# Patient Record
Sex: MALE | Race: OTHER | HISPANIC OR LATINO | Employment: FULL TIME | URBAN - METROPOLITAN AREA
[De-identification: names, ages, dates, MRNs, and addresses within clinical notes are randomized per-mention and may not be internally consistent; named-entity substitution may affect disease eponyms.]

---

## 2020-02-28 ENCOUNTER — HOSPITAL ENCOUNTER (EMERGENCY)
Facility: HOSPITAL | Age: 40
Discharge: HOME | End: 2020-02-28
Attending: EMERGENCY MEDICINE | Admitting: EMERGENCY MEDICINE
Payer: OTHER MISCELLANEOUS

## 2020-02-28 VITALS
SYSTOLIC BLOOD PRESSURE: 157 MMHG | DIASTOLIC BLOOD PRESSURE: 95 MMHG | RESPIRATION RATE: 18 BRPM | HEART RATE: 84 BPM | TEMPERATURE: 97.8 F | OXYGEN SATURATION: 98 %

## 2020-02-28 DIAGNOSIS — M54.12 CERVICAL RADICULOPATHY: Primary | ICD-10-CM

## 2020-02-28 PROCEDURE — 99283 EMERGENCY DEPT VISIT LOW MDM: CPT

## 2020-02-28 PROCEDURE — 99284 EMERGENCY DEPT VISIT MOD MDM: CPT | Performed by: PHYSICIAN ASSISTANT

## 2020-02-28 RX ORDER — NAPROXEN 500 MG/1
500 TABLET ORAL 2 TIMES DAILY WITH MEALS
COMMUNITY
End: 2020-03-03 | Stop reason: SDUPTHER

## 2020-02-28 NOTE — DISCHARGE INSTRUCTIONS
Please refer to the attached information for strict return instructions  If symptoms worsen or new symptoms develop please return to the ER  Please follow up with orthopedics OR with primary care physician

## 2020-02-29 NOTE — ED PROVIDER NOTES
History  Chief Complaint   Patient presents with    Arm Pain     Pt states that he went to [facility] and that he had XRAYS but they didn't find anything and told him to find a specialist  Pt c/o right sided neck pain that radiates down right arm after lifting something heavy at work      Halina Kline is a 45 yo M presenting with R sided neck/shoulder pain which radiates down R arm ongoing for approximately 2-3 weeks after lifting a heavy object at work  States pain is sharp, shooting, radiates down R arm to level of hand  Describes tingling in R hand, denies numbness/weakness  No other recent fall/trauma  Denies chest pain/SOB  Denies headache  No fevers/chills  Taking naproxen/robaxin as needed with minimal relief  Pt requests referral to specialist for follow up  History provided by:  Patient   used: No    Neck Pain   Pain location:  R side  Quality:  Stabbing and shooting  Pain radiates to:  R arm and R shoulder  Duration:  3 weeks  Timing:  Constant  Progression:  Waxing and waning  Chronicity:  New  Context: lifting a heavy object    Context: not fall and not recent injury    Associated symptoms: tingling    Associated symptoms: no bladder incontinence, no bowel incontinence, no chest pain, no fever, no numbness and no weakness    Risk factors: no hx of spinal trauma, no recent epidural and no recent head injury        Prior to Admission Medications   Prescriptions Last Dose Informant Patient Reported? Taking?   naproxen (NAPROSYN) 500 mg tablet   Yes Yes   Sig: Take 500 mg by mouth 2 (two) times a day with meals      Facility-Administered Medications: None       History reviewed  No pertinent past medical history  History reviewed  No pertinent surgical history  History reviewed  No pertinent family history  I have reviewed and agree with the history as documented      E-Cigarette/Vaping    E-Cigarette Use Never User      E-Cigarette/Vaping Substances     Social History     Tobacco Use    Smoking status: Never Smoker    Smokeless tobacco: Never Used   Substance Use Topics    Alcohol use: Yes     Comment: socially    Drug use: Not Currently       Review of Systems   Constitutional: Negative for chills and fever  HENT: Negative for congestion, rhinorrhea and sore throat  Eyes: Negative for pain and visual disturbance  Respiratory: Negative for cough, shortness of breath and wheezing  Cardiovascular: Negative for chest pain and palpitations  Gastrointestinal: Negative for abdominal pain, bowel incontinence, nausea and vomiting  Genitourinary: Negative for bladder incontinence, dysuria, frequency and urgency  Musculoskeletal: Positive for arthralgias and neck pain  Negative for back pain, joint swelling and neck stiffness  Skin: Negative for rash and wound  Neurological: Positive for tingling  Negative for dizziness, weakness, light-headedness and numbness  Physical Exam  Physical Exam   Constitutional: He is oriented to person, place, and time  He appears well-developed and well-nourished  No distress  HENT:   Head: Normocephalic and atraumatic  Right Ear: External ear normal    Left Ear: External ear normal    Mouth/Throat: Oropharynx is clear and moist    Eyes: Pupils are equal, round, and reactive to light  Conjunctivae and EOM are normal    Neck: Normal range of motion  Neck supple  Cardiovascular: Normal rate, regular rhythm, normal heart sounds and intact distal pulses  Exam reveals no gallop and no friction rub  No murmur heard  Pulmonary/Chest: Effort normal and breath sounds normal  No respiratory distress  He has no wheezes  Abdominal: Soft  He exhibits no distension  There is no tenderness  Musculoskeletal: He exhibits tenderness  R sided cervical paraspinal TTP, TTP over R trapezius  Normal ROM c-spine in flexion/extension  Normal ROM R shoulder in abduction/flexion  5/5 symmetric strength to b/l UE   Sensation normal, symmetric b/l UE Lymphadenopathy:     He has no cervical adenopathy  Neurological: He is alert and oriented to person, place, and time  He exhibits normal muscle tone  Coordination normal    Skin: Skin is warm and dry  Capillary refill takes less than 2 seconds  No rash noted  He is not diaphoretic  No erythema  Psychiatric: He has a normal mood and affect  His behavior is normal  Judgment and thought content normal        Vital Signs  ED Triage Vitals   Temperature Pulse Respirations Blood Pressure SpO2   02/28/20 1445 02/28/20 1445 02/28/20 1445 02/28/20 1445 02/28/20 1445   97 8 °F (36 6 °C) 84 18 157/95 98 %      Temp Source Heart Rate Source Patient Position - Orthostatic VS BP Location FiO2 (%)   02/28/20 1445 02/28/20 1445 02/28/20 1445 02/28/20 1445 --   Oral Monitor Sitting Right arm       Pain Score       02/28/20 1449       6           Vitals:    02/28/20 1445   BP: 157/95   Pulse: 84   Patient Position - Orthostatic VS: Sitting         Visual Acuity      ED Medications  Medications - No data to display    Diagnostic Studies  Results Reviewed     None                 No orders to display              Procedures  Procedures         ED Course                               MDM  Number of Diagnoses or Management Options  Cervical radiculopathy:   Diagnosis management comments: History/exam c/w R sided cervical radiculopathy  Two previous evaluations for similar  Pt requests referral to specialist  Will refer pt for follow up with orthopedics/PCP  Supportive care at home  Return indications discussed       Patient Progress  Patient progress: stable        Disposition  Final diagnoses:   Cervical radiculopathy     Time reflects when diagnosis was documented in both MDM as applicable and the Disposition within this note     Time User Action Codes Description Comment    2/28/2020  3:48 PM [facility] Add [Y14 12] Cervical radiculopathy       ED Disposition     ED Disposition Condition Date/Time Comment    Discharge Stable Fri Feb 28, 2020  3:47 PM Jimmy Cruz discharge to home/self care  Follow-up Information     Follow up With Specialties Details Why Contact Info Additional [address] [facility] Orthopedic Surgery Schedule an appointment as soon as possible for a visit   [address]  [address] [facility] [address]  [address] [facility], [address], [address] [place], [place], [address]   [address] [facility] Schedule an appointment as soon as possible for a visit   [address], [address] [facility] [address]  [address], [address], [address] [place], [place], [address]          Discharge Medication List as of 2/28/2020  3:51 PM      CONTINUE these medications which have NOT CHANGED    Details   naproxen (NAPROSYN) 500 mg tablet Take 500 mg by mouth 2 (two) times a day with meals, Historical Med           No discharge procedures on file      PDMP Review     None          ED Provider  Electronically Signed by           Caro Arroyo PA-C  02/29/20 [id number]

## 2020-03-02 RX ORDER — METHOCARBAMOL 750 MG/1
750 TABLET, FILM COATED ORAL 4 TIMES DAILY PRN
COMMUNITY
Start: 2020-02-21 | End: 2020-06-01

## 2020-03-02 RX ORDER — NAPROXEN 500 MG/1
500 TABLET ORAL EVERY 12 HOURS PRN
COMMUNITY
Start: 2020-02-21 | End: 2020-03-03 | Stop reason: HOSPADM

## 2020-03-02 NOTE — PROGRESS NOTES
1  Neck pain  naproxen (NAPROSYN) 500 mg tablet    [facility]   2  Acute pain of right shoulder  [facility]   3  Radiculopathy, cervical region  Ambulatory referral to Pain Management   4  Impingement syndrome of right shoulder  Large joint arthrocentesis     Orders Placed This Encounter   Procedures    Large joint arthrocentesis    Ambulatory referral to Pain Management    [facility]        Imaging Studies (I personally reviewed images in PACS and report):      IMPRESSION:  Cervical radiculopathy  Right shoulder impingement syndrome      Repeat X-ray next visit:  Right shoulder       Return in about 1 week (around 3/10/2020)  Patient Instructions   I explained my clinical findings to patient today  I believe most of his pain is related to cervical radiculopathy with some component of right shoulder impingement syndrome  I will refer him to physical therapy and pain management for cervical neuropathy  I also provided him with intra-articular corticosteroid diagnostic/therapeutic injection to right shoulder  I provided him with work note that states that he should not use he has right upper extremity for next 7 days      Up to 20% of the population suffers from neck pain, and of the causes for neck pain, arthritis is the most common  The neck bones are known as vertebrae and number from C1 (cervical 1) to C7  The most common sites of "degenerative changes" (arthritis) are "betwween C4 & C7 " Nerves come out between each of these vertebrae and are labeled the left and right nerves  For example, the left C8 nerve is responsible for left sided finger flexion and sesnation of the pinky small finger  Some nerves higher up such as C3-C5 control other basic functions such as breathing          Causes of neck pain include neck strain which is an injury to muscles of the neck that results in neck muscles and trapezius tenderness, does not have any pinched nerve arm pain or numbness (known as radiculopathy), and lasts up to 6 weeks  Another name for neck strain is whiplash injury which often occurs in car accidents and can range from mild stiffness with some loss of movement to pinched nerves and even fractures of the vertebrae in severe cases  Arthritis of the neck is called Spondylosis and comes in different types  Facet joint arthritis describes loss of cushion between the small joints between the vertebrae and can cause chronic pain but not all people with spondylosis have persistent pain  The large joints between the vertebrae known as discs can also become arthritic as defined by loss of cushion space between these vertebrae known as DDD (degenerative disc disease)  May people have DDD with some sutdies showing up to 81% of people on average of 38yo however most do not have any pain until years later  Pinched nerves of the spine can occur in two ways  Radiculopathy is pinching of the nerves that branch off of the spinal cord at each level and usually cause neck pain and symptoms of pain or numbness associated with the upper extremity fed by that nerve  Spinal stenosis, in contrast, is a narrowing of the spinal canal and causing pinching of the spinal cord which can cause symptoms throughout the body including lower leg weakness, walking problems, and bladder and bowel dysfunction  Most neck strains do not require xrays however we generally gather more information with xrays in people who have history of traumatic event or have pain that does not resolve after 6 weeks of treatment  We reserve MRIs for red flags symptoms (fevers, risks of cancer such as unintentional weight loss, night sweats, prior history of cancer, muscle weakness) and for neck pain that does not resolve after 6 weeks of conservative management   (uptodate Troy soriano 10/2018)    Treatment of neck pain includes special attention to ergonomics or sitting posture to avoid hunching and bad sleeping habits, physical therapy to strengthen muscles of the neck, and medications  Pain relievers such as tylenol are mainstays of treatment as well as anti-inflammatories such as naproxen (aleve), and ibuprofen (advil)  Muscle relaxer may be used as well and are reserved for use at night-time; however, they may be used during the day and if so, then the lowest possible does is used to avoid drowsiness and patients are instructed to refrain from driving or operating machinery due to risk of injury  Spinal manipulation as performed by a chiropractor or an osteopathic physician (DO) who performs manipulation as a part of practice may also be used if not improving with first-line treatments such as home exercise, medications, and physical therapy  For pain not responding to 6 weeks of conservative measures as outlined above, we generally order and MRI for further evaluation and refer to pain management for consideration of advance procedures such as neck injections (uptodate Nika Beard 10/2018)  You have been diagnosed with Rotator cuff impingement which is a pinching of the tendon, or cable, that attaches to the arm bone known as the humerus on the outside of your shoulder and can lead to pain, feelings of weakness due to pain, and worsening pain with reaching overhead, repetitive movements with your arm, or lying on your shoulder at night  There is no indication today of a significant or large rotator cuff tear requiring surgery although it is possible you may still have a small tear that does not require surgery at the moment  Impingement and even small rotator cuff tear symptoms usually improve with rest, range of motion exercises, and physical therapy over 1-3 months but sometimes can take 4-6 months  Additional treatments include steroid injection for inflammation; however, the pain relief from steroid injections is not long lasting and the curative treatment is physical therapy   If you continue to fail to improve with conservative measures after 3 months or if there is a red flag, then we will discuss obtaining an MRI at future visits to evaluate for severe rotator cuff tear  Surgery for impingement syndrome is usually not considered until after 4-6 months of initial therapy unless a severe rotator cuff tear is found on MRI  CHIEF COMPLAINT:  Neck pain, right shoulder pain    HPI:  Abimbola Miles is a 44 y o  male  who presents for       Visit 03/03/2020  44year-old male here with 2 week history of right neck and shoulder pain after he lifted heavy object(3 cases of ensure supplements)  He describes pain as sharp, 10/10, located to right side of the neck, radiating down the arm, associated with tingling and numbness  He denies any weakness  No previous injury to neck or shoulder  She went to ER 3 times in last 3 weeks  He has been taking Aleve with some improvement of his symptoms  Summary of ER visit to to 02/20/2020; patient presented with 2-3 week history of right-sided neck and shoulder pain which radiates down right arm  He was diagnosed with cervical radiculopathy and refer to [facility] Meneses [address]  Review of Systems   Constitutional: Negative for chills and fever  HENT: Negative for hearing loss and sore throat  Respiratory: Negative for cough and shortness of breath  Cardiovascular: Negative for chest pain and palpitations  Gastrointestinal: Negative for abdominal pain and nausea  Neurological: Negative for dizziness and numbness  Following history reviewed and update:    History reviewed  No pertinent past medical history  History reviewed  No pertinent surgical history    Social History   Social History     Substance and Sexual Activity   Alcohol Use Yes    Comment: socially     Social History     Substance and Sexual Activity   Drug Use Not Currently     Social History     Tobacco Use   Smoking Status Never Smoker   Smokeless Tobacco Never Used     No family history on file  Allergies   Allergen Reactions    Acetaminophen Rash          Physical Exam  /85 (BP Location: Left arm, Patient Position: Sitting, Cuff Size: Adult)   Pulse 91   Ht 5' 11" (1 803 m)   Wt 97 1 kg (214 lb)   BMI 29 85 kg/m²     Constitutional:  see vital signs  Gen: well-developed, normocephalic/atraumatic, well-groomed  Eyes: No inflammation or discharge of conjunctiva or lids; sclera clear   Pharynx: no inflammation, lesion, or mass of lips  Neck: supple, no masses, non-distended  MSK: no inflammation, lesion, mass, or clubbing of nails and digits except for other than mentioned below  SKIN: no visible rashes or skin lesions  Pulmonary/Chest: Effort normal  No respiratory distress     NEURO: cranial nerves grossly intact  PSYCH:  Alert and oriented to person, place, and time; recent and remote memory intact; mood normal, no depression, anxiety, or agitation, judgment and insight good and intact     Ortho Exam  Cervical  ROM: intact  Tenderness: no spinous process tenderness;   Sensation UE Bilateral:  Decreased sensation in right upper extremity C5, C6, C7    C8: normal  T1: normal  Strength UE: 5/5 elbow, wrist, fingers bilatearl  Reflexes: symmetric bilateral triceps, biceps, corachobrachiais  Spurlings:  Negative     RIGHT SHOULDER:  Erythema: no  Swelling: no  Increased Warmth: no    Tenderness:  + mild subacromial    ROM  Touchdown sign: intact  Passive: symmetric  Internal rotation to hip level    Strength  Abduction: 5/5  ER: 5/5  IR: 5/5    Drop-Arm: negative  Emptycan: negative  Belly Press: negative  Lift-off Test:  Negative    Ott:  Positive  Neer: negative  Cross-Arm: negative  Speeds: negative    LEFT SHOULDER:  Strength  Abduction: 5/5  ER: 5/5  IR: 5/5    ROM  Touchdown sign: intact  Appley Scratch Test: symmetric  Passive: symmetric    Empty can: negative      Large joint arthrocentesis  Date/Time: 3/3/2020 11:21 AM  Consent given by: patient  Supporting Documentation  Indications: pain   Procedure Details  Location: shoulder -   Needle size: 22 G  Ultrasound guidance: no  Approach: posterolateral  Medications administered: 4 mL lidocaine 1 %; 40 mg triamcinolone acetonide 40 mg/mL    Patient tolerance: patient tolerated the procedure well with no immediate complications  Dressing:  Sterile dressing applied            ATTESTATION:  I have personally interviewed and examined patient  I have reviewed curtis findings and plan with resident/fellow as outlined in note  I agree with exam and plan as documented in note

## 2020-03-03 ENCOUNTER — OFFICE VISIT (OUTPATIENT)
Dept: OBGYN CLINIC | Facility: OTHER | Age: 40
End: 2020-03-03
Payer: OTHER MISCELLANEOUS

## 2020-03-03 VITALS
BODY MASS INDEX: 29.96 KG/M2 | HEIGHT: 71 IN | HEART RATE: 91 BPM | SYSTOLIC BLOOD PRESSURE: 139 MMHG | WEIGHT: 214 LBS | DIASTOLIC BLOOD PRESSURE: 85 MMHG

## 2020-03-03 DIAGNOSIS — M54.12 RADICULOPATHY, CERVICAL REGION: ICD-10-CM

## 2020-03-03 DIAGNOSIS — M54.2 NECK PAIN: Primary | ICD-10-CM

## 2020-03-03 DIAGNOSIS — M25.511 ACUTE PAIN OF RIGHT SHOULDER: ICD-10-CM

## 2020-03-03 DIAGNOSIS — M75.41 IMPINGEMENT SYNDROME OF RIGHT SHOULDER: ICD-10-CM

## 2020-03-03 PROCEDURE — 99204 OFFICE O/P NEW MOD 45 MIN: CPT | Performed by: FAMILY MEDICINE

## 2020-03-03 PROCEDURE — 20610 DRAIN/INJ JOINT/BURSA W/O US: CPT | Performed by: FAMILY MEDICINE

## 2020-03-03 RX ORDER — NAPROXEN 500 MG/1
500 TABLET ORAL 2 TIMES DAILY WITH MEALS
Qty: 28 TABLET | Refills: 0 | Status: SHIPPED | OUTPATIENT
Start: 2020-03-03 | End: 2020-03-09

## 2020-03-03 RX ADMIN — TRIAMCINOLONE ACETONIDE 40 MG: 40 INJECTION, SUSPENSION INTRA-ARTICULAR; INTRAMUSCULAR at 11:21

## 2020-03-03 RX ADMIN — LIDOCAINE HYDROCHLORIDE 4 ML: 10 INJECTION, SOLUTION INFILTRATION; PERINEURAL at 11:21

## 2020-03-03 NOTE — PATIENT INSTRUCTIONS
I explained my clinical findings to patient today  I believe most of his pain is related to cervical radiculopathy with some component of right shoulder impingement syndrome  I will refer him to physical therapy and pain management for cervical neuropathy  I also provided him with intra-articular corticosteroid diagnostic/therapeutic injection to right shoulder  I provided him with work note that states that he should not use he has right upper extremity for next 7 days      Up to 20% of the population suffers from neck pain, and of the causes for neck pain, arthritis is the most common  The neck bones are known as vertebrae and number from C1 (cervical 1) to C7  The most common sites of "degenerative changes" (arthritis) are "betwween C4 & C7 " Nerves come out between each of these vertebrae and are labeled the left and right nerves  For example, the left C8 nerve is responsible for left sided finger flexion and sesnation of the pinky small finger  Some nerves higher up such as C3-C5 control other basic functions such as breathing  Causes of neck pain include neck strain which is an injury to muscles of the neck that results in neck muscles and trapezius tenderness, does not have any pinched nerve arm pain or numbness (known as radiculopathy), and lasts up to 6 weeks  Another name for neck strain is whiplash injury which often occurs in car accidents and can range from mild stiffness with some loss of movement to pinched nerves and even fractures of the vertebrae in severe cases  Arthritis of the neck is called Spondylosis and comes in different types  Facet joint arthritis describes loss of cushion between the small joints between the vertebrae and can cause chronic pain but not all people with spondylosis have persistent pain       The large joints between the vertebrae known as discs can also become arthritic as defined by loss of cushion space between these vertebrae known as DDD (degenerative disc disease)  May people have DDD with some cleoes showing up to 81% of people on average of 38yo however most do not have any pain until years later  Pinched nerves of the spine can occur in two ways  Radiculopathy is pinching of the nerves that branch off of the spinal cord at each level and usually cause neck pain and symptoms of pain or numbness associated with the upper extremity fed by that nerve  Spinal stenosis, in contrast, is a narrowing of the spinal canal and causing pinching of the spinal cord which can cause symptoms throughout the body including lower leg weakness, walking problems, and bladder and bowel dysfunction  Most neck strains do not require xrays however we generally gather more information with xrays in people who have history of traumatic event or have pain that does not resolve after 6 weeks of treatment  We reserve MRIs for red flags symptoms (fevers, risks of cancer such as unintentional weight loss, night sweats, prior history of cancer, muscle weakness) and for neck pain that does not resolve after 6 weeks of conservative management  (uptodaguy [employer] al 10/2018)    Treatment of neck pain includes special attention to ergonomics or sitting posture to avoid hunching and bad sleeping habits, physical therapy to strengthen muscles of the neck, and medications  Pain relievers such as tylenol are mainstays of treatment as well as anti-inflammatories such as naproxen (aleve), and ibuprofen (advil)  Muscle relaxer may be used as well and are reserved for use at night-time; however, they may be used during the day and if so, then the lowest possible does is used to avoid drowsiness and patients are instructed to refrain from driving or operating machinery due to risk of injury       Spinal manipulation as performed by a chiropractor or an osteopathic physician (DO) who performs manipulation as a part of practice may also be used if not improving with first-line treatments such as home exercise, medications, and physical therapy  For pain not responding to 6 weeks of conservative measures as outlined above, we generally order and MRI for further evaluation and refer to pain management for consideration of advance procedures such as neck injections (uptodate Lauren Ashford 10/2018)  You have been diagnosed with Rotator cuff impingement which is a pinching of the tendon, or cable, that attaches to the arm bone known as the humerus on the outside of your shoulder and can lead to pain, feelings of weakness due to pain, and worsening pain with reaching overhead, repetitive movements with your arm, or lying on your shoulder at night  There is no indication today of a significant or large rotator cuff tear requiring surgery although it is possible you may still have a small tear that does not require surgery at the moment  Impingement and even small rotator cuff tear symptoms usually improve with rest, range of motion exercises, and physical therapy over 1-3 months but sometimes can take 4-6 months  Additional treatments include steroid injection for inflammation; however, the pain relief from steroid injections is not long lasting and the curative treatment is physical therapy  If you continue to fail to improve with conservative measures after 3 months or if there is a red flag, then we will discuss obtaining an MRI at future visits to evaluate for severe rotator cuff tear  Surgery for impingement syndrome is usually not considered until after 4-6 months of initial therapy unless a severe rotator cuff tear is found on MRI

## 2020-03-03 NOTE — LETTER
March 3, 2020     Patient: Charlotte Gary   YOB: 1980   Date of Visit: 3/3/2020       To Whom it May Concern:    Shoaib Santiago is under my professional care  He was seen in my office on 3/3/2020  He should not go to work for next 7 days  He will be reevaluated by me in 7 days    If you have any questions or concerns, please don't hesitate to call  Sincerely,          Ruthann Stern III, DO        CC: Jimmy Cruz

## 2020-03-06 RX ORDER — LIDOCAINE HYDROCHLORIDE 10 MG/ML
4 INJECTION, SOLUTION INFILTRATION; PERINEURAL
Status: COMPLETED | OUTPATIENT
Start: 2020-03-03 | End: 2020-03-03

## 2020-03-06 RX ORDER — TRIAMCINOLONE ACETONIDE 40 MG/ML
40 INJECTION, SUSPENSION INTRA-ARTICULAR; INTRAMUSCULAR
Status: COMPLETED | OUTPATIENT
Start: 2020-03-03 | End: 2020-03-03

## 2020-03-09 ENCOUNTER — APPOINTMENT (OUTPATIENT)
Dept: RADIOLOGY | Facility: MEDICAL CENTER | Age: 40
End: 2020-03-09
Payer: OTHER MISCELLANEOUS

## 2020-03-09 ENCOUNTER — HOSPITAL ENCOUNTER (OUTPATIENT)
Dept: MRI IMAGING | Facility: HOSPITAL | Age: 40
Discharge: HOME | End: 2020-03-09
Payer: OTHER MISCELLANEOUS

## 2020-03-09 ENCOUNTER — TELEPHONE (OUTPATIENT)
Dept: OBGYN CLINIC | Facility: MEDICAL CENTER | Age: 40
End: 2020-03-09

## 2020-03-09 ENCOUNTER — OFFICE VISIT (OUTPATIENT)
Dept: OBGYN CLINIC | Facility: MEDICAL CENTER | Age: 40
End: 2020-03-09
Payer: OTHER MISCELLANEOUS

## 2020-03-09 VITALS
SYSTOLIC BLOOD PRESSURE: 161 MMHG | WEIGHT: 215 LBS | BODY MASS INDEX: 30.1 KG/M2 | DIASTOLIC BLOOD PRESSURE: 85 MMHG | HEART RATE: 94 BPM | HEIGHT: 71 IN

## 2020-03-09 DIAGNOSIS — R29.898 WEAKNESS OF RIGHT ARM: ICD-10-CM

## 2020-03-09 DIAGNOSIS — M25.511 ACUTE PAIN OF RIGHT SHOULDER: ICD-10-CM

## 2020-03-09 DIAGNOSIS — M79.601 RIGHT ARM PAIN: Primary | ICD-10-CM

## 2020-03-09 DIAGNOSIS — M79.601 RIGHT ARM PAIN: ICD-10-CM

## 2020-03-09 PROCEDURE — 72141 MRI NECK SPINE W/O DYE: CPT

## 2020-03-09 PROCEDURE — 73030 X-RAY EXAM OF SHOULDER: CPT

## 2020-03-09 PROCEDURE — 99214 OFFICE O/P EST MOD 30 MIN: CPT | Performed by: FAMILY MEDICINE

## 2020-03-09 NOTE — LETTER
March 9, 2020     Patient: Eber Kasper   YOB: 1980   Date of Visit: 3/9/2020       To Whom it May Concern:    Yves Arvizusteffen is under my professional care  He was seen in my office on 3/9/2020  He may return to work on 03/09/2020 with light duty restrictions  Recommend against use of right upper extremity  Recommend against forklift driving     He will be re-evaluated in next 2 3 weeks  If you have any questions or concerns, please don't hesitate to call  Sincerely,          Jolene Allan III, DO        CC: Jimmy Cruz

## 2020-03-09 NOTE — TELEPHONE ENCOUNTER
Needs appt with pain management urgently due to weakness right arm  I have already ordered mri cervical spine

## 2020-03-09 NOTE — PATIENT INSTRUCTIONS
Explained the patient that he has evidence of pinched nerve on his examination today with developing weakness in his right arm  As such I ordered stat MRI of cervical vertebra as well as stat Pain Management appointment for further evaluation  He did have a subacromial injection in the past which on resulted in minimal to mild relief for 2 days but his exam today is more consistent with cervical radiculopathy  I explained risk of persistent weakness if does not follow-up for urgent evaluation  Patient expressed understanding and agreed to plan  Stop naproxen  Recommend against tylenol due to history of allergy  Start trial diclofenac gel    I notified of elevated blood pressure which can be a sign of new hypertension or undiagnosed hypertension  I reviewed risks of elevated blood pressure including heart attack and stroke  I recommended go to ER if develops any headaches, change in vision, chest pain, SOB/trouble breathing  I instructed to follow-up with PCP and/or cardiologist for further evaluation  Patient expressed understanding and agreed to plan

## 2020-03-09 NOTE — PROGRESS NOTES
1  Right arm pain  Ambulatory referral to Pain Management    MRI cervical spine wo contrast    diclofenac sodium (VOLTAREN) 1 %   2  Acute pain of right shoulder  XR shoulder 2+ vw right    Ambulatory referral to Pain Management    MRI cervical spine wo contrast    diclofenac sodium (VOLTAREN) 1 %   3  Weakness of right arm  Ambulatory referral to Pain Management    MRI cervical spine wo contrast     Orders Placed This Encounter   Procedures    XR shoulder 2+ vw right    MRI cervical spine wo contrast    Ambulatory referral to Pain Management        Imaging Studies (I personally reviewed images in PACS and report):  X-ray right shoulder 03/09/2020:  No acute osseous abnormality  X-ray cervical vertebra 03/09/2020:  No acute osseous abnormality  IMPRESSION:  Right Arm pain  Right Arm weakness  Differential diagnosis:  Cervical radiculopathy  Component of subacromial impingement with 2 day relief status post subacromial injection corticosteroid      Repeat X-ray next visit:   none      Return for follow-up with pain management   Patient Instructions   Explained the patient that he has evidence of pinched nerve on his examination today with developing weakness in his right arm  As such I ordered stat MRI of cervical vertebra as well as stat Pain Management appointment for further evaluation  He did have a subacromial injection in the past which on resulted in minimal to mild relief for 2 days but his exam today is more consistent with cervical radiculopathy  I explained risk of persistent weakness if does not follow-up for urgent evaluation  Patient expressed understanding and agreed to plan  Stop naproxen  Recommend against tylenol due to history of allergy  Start trial diclofenac gel    I notified of elevated blood pressure which can be a sign of new hypertension or undiagnosed hypertension  I reviewed risks of elevated blood pressure including heart attack and stroke   I recommended go to ER if develops any headaches, change in vision, chest pain, SOB/trouble breathing  I instructed to follow-up with PCP and/or cardiologist for further evaluation  Patient expressed understanding and agreed to plan  CHIEF COMPLAINT:  Follow-up right shoulder pain and arm pain    HPI:  Ravi Blanco is a 44 y o  male  who presents for       Visit 03/09/2020: Follow-up right shoulder pain  Last visit given subacromial injection as a trial for possible component of impingement syndrome  He did have improvement for approximately 2 days but then had recurrent of symptoms  He describes radiating pain down his right arm from his neck region towards his hand  He denies any neck pain  He states that most of his pain is from the elbow down towards his hand  Intermittent numbness  He describes moderate intensity pain  He takes naproxen which offers intermittent relief  Review of Systems   Constitutional: Negative for chills and fever  HENT: Negative for hearing loss  Eyes: Negative for visual disturbance  Respiratory: Negative for shortness of breath  Cardiovascular: Negative for chest pain  Gastrointestinal: Negative for abdominal pain  Genitourinary: Negative for difficulty urinating and dysuria  Neurological: Negative for weakness and numbness  Psychiatric/Behavioral: Negative for suicidal ideas  Following history reviewed and update:    History reviewed  No pertinent past medical history  History reviewed  No pertinent surgical history  Social History   Social History     Substance and Sexual Activity   Alcohol Use Yes    Comment: socially     Social History     Substance and Sexual Activity   Drug Use Not Currently     Social History     Tobacco Use   Smoking Status Never Smoker   Smokeless Tobacco Never Used     History reviewed  No pertinent family history    Allergies   Allergen Reactions    Acetaminophen Rash          Physical Exam  /85   Pulse 94   Ht 5' 11" (1 803 m)   Wt 97 5 kg (215 lb)   BMI 29 99 kg/m²     Constitutional:  see vital signs  Gen: well-developed, normocephalic/atraumatic, well-groomed  Eyes: No inflammation or discharge of conjunctiva or lids; sclera clear   Pharynx: no inflammation, lesion, or mass of lips  Neck: supple, no masses, non-distended  MSK: no inflammation, lesion, mass, or clubbing of nails and digits except for other than mentioned below  SKIN: no visible rashes or skin lesions  Pulmonary/Chest: Effort normal  No respiratory distress  NEURO: cranial nerves grossly intact  PSYCH:  Alert and oriented to person, place, and time; recent and remote memory intact; mood normal, no depression, anxiety, or agitation, judgment and insight good and intact     Ortho Exam  Cervical  ROM: intact  Tenderness: no spinous process tenderness; no other tenderness  Sensation UE Bilateral:  C5: normal  C6: normal  C7: normal  C8: normal  T1: normal  Strength UE:   5/5 elbow, wrist, fingers bilateral Left  4/5 left elbow flexion-extension, wrist flexion-extension, finger extension; 5/5 finger flexion        RIGHT SHOULDER:  Erythema: no  Swelling: no  Increased Warmth: no    Tenderness: mild right subacromial    ROM  Touchdown sign: intact  Appley Scratch Test: symmetric  Passive: symmetric    Strength  Abduction: 5/5  ER: 5/5  IR: 5/5    Drop-Arm: negative  Emptycan:+  Belly Press: negative  Lift-off Test:    Ott:+  Neer: negative  Cross-Arm: negative  Speeds: negative  LEFT SHOULDER:  Strength  Abduction: 5/5  ER: 5/5  IR: 5/5    ROM  Touchdown sign: intact  Appley Scratch Test: symmetric  Passive: symmetric    Empty can: negative       Procedures    MA Wileythee Smith present for encounter and provided interpretation

## 2020-03-10 ENCOUNTER — TELEPHONE (OUTPATIENT)
Dept: OBGYN CLINIC | Facility: HOSPITAL | Age: 40
End: 2020-03-10

## 2020-03-10 NOTE — TELEPHONE ENCOUNTER
Faxed work note dated 3/9 to Anne at [facility] # [phone]

## 2020-03-12 ENCOUNTER — CONSULT (OUTPATIENT)
Dept: PAIN MEDICINE | Facility: MEDICAL CENTER | Age: 40
End: 2020-03-12
Payer: OTHER MISCELLANEOUS

## 2020-03-12 VITALS
BODY MASS INDEX: 29.96 KG/M2 | DIASTOLIC BLOOD PRESSURE: 87 MMHG | WEIGHT: 214 LBS | HEIGHT: 71 IN | SYSTOLIC BLOOD PRESSURE: 143 MMHG | HEART RATE: 77 BPM

## 2020-03-12 DIAGNOSIS — M54.12 CERVICAL RADICULITIS: Primary | ICD-10-CM

## 2020-03-12 DIAGNOSIS — M25.511 ACUTE PAIN OF RIGHT SHOULDER: ICD-10-CM

## 2020-03-12 PROCEDURE — 99244 OFF/OP CNSLTJ NEW/EST MOD 40: CPT | Performed by: PHYSICAL MEDICINE & REHABILITATION

## 2020-03-12 RX ORDER — METHYLPREDNISOLONE 4 MG/1
TABLET ORAL
Qty: 21 TABLET | Refills: 0 | Status: SHIPPED | OUTPATIENT
Start: 2020-03-12 | End: 2020-06-01

## 2020-03-12 RX ORDER — GABAPENTIN 300 MG/1
300 CAPSULE ORAL 3 TIMES DAILY
Qty: 90 CAPSULE | Refills: 1 | Status: SHIPPED | OUTPATIENT
Start: 2020-03-12 | End: 2020-03-23 | Stop reason: SDUPTHER

## 2020-03-12 NOTE — LETTER
March 12, 2020     Patient: Elina Bush   YOB: 1980   Date of Visit: 3/12/2020       To Whom it May Concern:    Smitamika Greeneato is under my professional care  He was seen in my office on 3/12/2020  He may return to work on 3/13/2020  If you have any questions or concerns, please don't hesitate to call  Sincerely,          Kenneth Hutchins, DO        CC: Jimmy Cruz

## 2020-03-12 NOTE — PROGRESS NOTES
Assessment  1  Cervical radiculitis    2  Acute pain of right shoulder        Plan  1  Initiate Medrol Dosepak  2  Initiate gabapentin 300 mg q h s  And titrate up to t i d  Dosing  3  Return in 2 weeks to evaluate his response, if he continues to have significant pain would offer a right C7-T1 interlaminar epidural steroid injection  4  If he develops any progressive weakness recommend urgent consultation with spine surgery  My impressions and treatment recommendations were discussed in detail with the patient who verbalized understanding and had no further questions  Discharge instructions were provided  I personally saw and examined the patient and I agree with the above discussed plan of care  No orders of the defined types were placed in this encounter  New Medications Ordered This Visit   Medications    methylPREDNISolone 4 MG tablet therapy pack     Sig: Use as directed on package     Dispense:  21 tablet     Refill:  0    gabapentin (NEURONTIN) 300 mg capsule     Sig: Take 1 capsule (300 mg total) by mouth 3 (three) times a day     Dispense:  90 capsule     Refill:  1       History of Present Illness    Chet Lake is a 44 y o  male seen in consultation at the request of Dr Mahendra Menard regarding neck and radiating right arm pain consistent with cervical radiculitis  The patient has been experiencing pain since February 13th after a incident at work  He is describing moderate pain at this time rated as a 6/10 which is intermittent in nature and worse at night  He characterizes the pain as burning, cramping, numbness, sharp, pins and needles  This is in the neck and radiating down the right arm  He is unable to determine any significant aggravating or alleviating factors  Diagnostic studies include MRI of the cervical spine  This does reveal right neural foraminal narrowing at C5-6  This is likely accounting for his current symptoms      Currently using diclofenac and did try muscle relaxers but no significant improvement with that  I have personally reviewed and/or updated the patient's past medical history, past surgical history, family history, social history, current medications, allergies, and vital signs today  Review of Systems   Constitutional: Negative for fever and unexpected weight change  HENT: Negative for trouble swallowing  Eyes: Negative for visual disturbance  Respiratory: Negative for shortness of breath and wheezing  Cardiovascular: Negative for chest pain and palpitations  Gastrointestinal: Negative for constipation, diarrhea, nausea and vomiting  Endocrine: Negative for cold intolerance, heat intolerance and polydipsia  Genitourinary: Negative for difficulty urinating and frequency  Musculoskeletal: Positive for gait problem (right shoulder pain and arm pain  )  Negative for arthralgias, joint swelling and myalgias  Skin: Negative for rash  Neurological: Negative for dizziness, seizures, syncope, weakness and headaches  Hematological: Does not bruise/bleed easily  Psychiatric/Behavioral: Negative for dysphoric mood  All other systems reviewed and are negative  Patient Active Problem List   Diagnosis    Neck pain    Acute pain of right shoulder    Radiculopathy, cervical region       History reviewed  No pertinent past medical history  History reviewed  No pertinent surgical history      Family History   Problem Relation Age of Onset    No Known Problems Daughter     No Known Problems Son        Social History     Occupational History    Not on file   Tobacco Use    Smoking status: Never Smoker    Smokeless tobacco: Never Used   Substance and Sexual Activity    Alcohol use: Yes     Comment: socially    Drug use: Not Currently    Sexual activity: Not on file       Current Outpatient Medications on File Prior to Visit   Medication Sig    diclofenac sodium (VOLTAREN) 1 % Apply 2 g topically 4 (four) times a day    methocarbamol (ROBAXIN) 750 mg tablet Take 750 mg by mouth 4 (four) times a day as needed     No current facility-administered medications on file prior to visit  Allergies   Allergen Reactions    Acetaminophen Rash       Physical Exam    /87   Pulse 77   Ht 5' 11" (1 803 m)   Wt 97 1 kg (214 lb)   BMI 29 85 kg/m²     CERVICAL  General: Well-developed, well-nourished individual in no acute distress  Mental: Appropriate mood and affect  Grossly oriented with coherent speech and thought processing   Neuro:  Cranial nerves: Cranial nerve function is grossly intact bilaterally   Strength: Bilateral upper extremity strength is normal and symmetric except for some mild pain related weakness on the right upper extremity  No atrophy or tone abnormalities noted   Reflexes: Bilateral upper extremity muscle stretch reflexes are physiologic and symmetric   No Trejo sign   Sensation: No loss of sensation is noted   Gait:  Gait/gross motor: Gait is normal  Station is normal      Musculoskeletal:  Palpation: Inspection and palpation of the spine and extremities are unremarkable   Spine: Normal pain-free range of motion   No gross axial skeletal deformities   Skin: Skin inspection grossly negative for erythema, breakdown, or concerning lesions in affected area   Lymph: No lymphadenopathy is appreciated in the involved extremity   Vessels: No lower extremity edema   Lungs: Breathing is comfortable and regular  No dyspnea noted during examination   Eyes: Visual field grossly intact to confrontation  No redness appreciated  ENT: No craniofacial deformities or asymmetry  No neck masses appreciated            Imaging    MRI CERVICAL SPINE WITHOUT CONTRAST     INDICATION: M25 511: Pain in right shoulder  R29 898: Other symptoms and signs involving the musculoskeletal system  M79 601: Pain in right arm      COMPARISON:  None      TECHNIQUE:  Sagittal T1, sagittal T2, sagittal inversion recovery, axial T2, axial  2D merge     IMAGE QUALITY:  Diagnostic     FINDINGS:     ALIGNMENT:  Normal alignment of the cervical spine  No compression fracture  No subluxation  No scoliosis      MARROW SIGNAL:  Limited cord signal abnormality due to truncation artifact  Nondiagnostic for cord signal abnormality      CERVICAL AND VISUALIZED THORACIC CORD:  Normal signal within the visualized cord      PREVERTEBRAL AND PARASPINAL SOFT TISSUES:  Normal      VISUALIZED POSTERIOR FOSSA:  The visualized posterior fossa demonstrates no abnormal signal      CERVICAL DISC SPACES:     C2-C3:  Normal      C3-C4:  Normal      C4-C5:  Disc osteophyte complex causing anterior impression on the thecal sac      C5-C6:  Right disc osteophyte complex/uncovertebral hypertrophy causing moderate to severe right neural foraminal narrowing      C6-C7:  Disc osteophyte complex causing anterior impression on the thecal sac   Mild neural foraminal narrowing caused by uncovertebral hypertrophy      C7-T1:  Disc osteophyte complex causing anterior impression on the thecal sac      UPPER THORACIC DISC SPACES:  Normal      IMPRESSION:     Degenerative changes most severe right neural foraminal narrowing at C5-C6      Nondiagnostic for cord signal abnormality due to artifact

## 2020-03-19 ENCOUNTER — TELEPHONE (OUTPATIENT)
Dept: OBGYN CLINIC | Facility: MEDICAL CENTER | Age: 40
End: 2020-03-19

## 2020-03-23 ENCOUNTER — TELEMEDICINE (OUTPATIENT)
Dept: PAIN MEDICINE | Facility: MEDICAL CENTER | Age: 40
End: 2020-03-23
Payer: COMMERCIAL

## 2020-03-23 DIAGNOSIS — M54.12 CERVICAL RADICULITIS: ICD-10-CM

## 2020-03-23 PROCEDURE — G2012 BRIEF CHECK IN BY MD/QHP: HCPCS | Performed by: PHYSICAL MEDICINE & REHABILITATION

## 2020-03-23 RX ORDER — GABAPENTIN 300 MG/1
300 CAPSULE ORAL 3 TIMES DAILY
Qty: 270 CAPSULE | Refills: 0 | Status: SHIPPED | OUTPATIENT
Start: 2020-03-23 | End: 2020-06-01

## 2020-03-23 NOTE — PROGRESS NOTES
Virtual Regular Visit    Problem List Items Addressed This Visit     None      Visit Diagnoses     Cervical radiculitis        Relevant Medications    gabapentin (NEURONTIN) 300 mg capsule               Reason for visit is followup related to cervical radiculitis    Encounter provider Suyapa Gil DO    Provider located at [address] [place] [address]      Recent Visits  No visits were found meeting these conditions  Showing recent visits within past 7 days and meeting all other requirements     Future Appointments  No visits were found meeting these conditions  Showing future appointments within next 150 days and meeting all other requirements        After connecting through [employer], the patient was identified by name and date of birth  Ramez Byrnes was informed that this is a telemedicine visit and that the visit is being conducted through telephone which may not be secure and therefore, might not be HIPAA-compliant  My office door was closed  No one else was in the room  He acknowledged consent and understanding of privacy and security of the video platform  The patient has agreed to participate and understands they can discontinue the visit at any time  The patient did utilize a  for the visit also via telephone call  This was a 3 way call  Subjective  Ramez Byrnes is a 44 y o  male presents for telephone follow-up visit related to neck and radiating arm pain  Since he was last seen in the office he has been doing very well with the addition of gabapentin and the Medrol Dosepak  He states that currently he is not experiencing any pain but does have some occasional intermittent pain which comes and goes  He is interested in returning to work at this point  I reviewed with him as long as he does not have any exacerbation of pain he is safe to return to work    He understands that if his pain worsens with any work activities he should let his employer no and contact me for further treatment options  No past medical history on file  No past surgical history on file  Current Outpatient Medications   Medication Sig Dispense Refill    diclofenac sodium (VOLTAREN) 1 % Apply 2 g topically 4 (four) times a day 1 Tube 1    gabapentin (NEURONTIN) 300 mg capsule Take 1 capsule (300 mg total) by mouth 3 (three) times a day 270 capsule 0    methocarbamol (ROBAXIN) 750 mg tablet Take 750 mg by mouth 4 (four) times a day as needed      methylPREDNISolone 4 MG tablet therapy pack Use as directed on package 21 tablet 0     No current facility-administered medications for this visit  Allergies   Allergen Reactions    Acetaminophen Rash       Assessment:  1  Cervical radiculitis    Plan:  1  Continue gabapentin 300 mg t i d   2  Patient may return to work at this point  We will give him a return to work note  If he has any worsening of symptoms I would like him to follow back up with me sooner  3  We will schedule in 8 week follow-up visit for him  I spent 16 minutes with the patient during this visit

## 2020-03-23 NOTE — LETTER
March 23, 2020     Patient: Home Pantoja   YOB: 1980   Date of Visit: 3/23/2020       To Whom it May Concern:    Julien Fletcher is under my professional care  He had a telephone visit with me on 3/23/2020  He may return to work on 03/24/2020  If he has any worsening of his symptoms he is to stop work immediately, let his employer no, and contact my office  If you have any questions or concerns, please don't hesitate to call           Sincerely,          Jayla Franks,         CC: No Recipients

## 2020-03-24 ENCOUNTER — TELEPHONE (OUTPATIENT)
Dept: OTHER | Facility: OTHER | Age: 40
End: 2020-03-24

## 2020-03-24 NOTE — TELEPHONE ENCOUNTER
Pt would like to return to work today and will need a note per his boss  Can an note be sent to ladan Hutchison@"[email or web address]"  Please contact pt if this can or cannot be completed

## 2020-04-16 ENCOUNTER — TELEPHONE (OUTPATIENT)
Dept: OBGYN CLINIC | Facility: HOSPITAL | Age: 40
End: 2020-04-16

## 2020-06-01 ENCOUNTER — TELEMEDICINE (OUTPATIENT)
Dept: PAIN MEDICINE | Facility: MEDICAL CENTER | Age: 40
End: 2020-06-01
Payer: COMMERCIAL

## 2020-06-01 DIAGNOSIS — M54.12 RADICULOPATHY, CERVICAL REGION: Primary | ICD-10-CM

## 2020-06-01 DIAGNOSIS — M54.2 NECK PAIN: ICD-10-CM

## 2020-06-01 PROCEDURE — 99441 PR PHYS/QHP TELEPHONE EVALUATION 5-10 MIN: CPT | Performed by: NURSE PRACTITIONER

## 2021-09-16 ENCOUNTER — APPOINTMENT (OUTPATIENT)
Dept: URGENT CARE | Age: 41
End: 2021-09-16